# Patient Record
Sex: MALE | Race: WHITE | NOT HISPANIC OR LATINO | URBAN - METROPOLITAN AREA
[De-identification: names, ages, dates, MRNs, and addresses within clinical notes are randomized per-mention and may not be internally consistent; named-entity substitution may affect disease eponyms.]

---

## 2019-03-27 ENCOUNTER — OUTSIDE PLACE OF SERVICE (OUTPATIENT)
Dept: FAMILY MEDICINE | Facility: CLINIC | Age: 84
End: 2019-03-27

## 2019-03-27 PROCEDURE — 99305 1ST NF CARE MODERATE MDM 35: CPT | Mod: ,,, | Performed by: FAMILY MEDICINE

## 2019-03-27 PROCEDURE — 99305 PR NURSING FACILITY CARE, INIT, MOD SEVERITY: ICD-10-PCS | Mod: ,,, | Performed by: FAMILY MEDICINE

## 2019-04-03 ENCOUNTER — OUTSIDE PLACE OF SERVICE (OUTPATIENT)
Dept: FAMILY MEDICINE | Facility: CLINIC | Age: 84
End: 2019-04-03

## 2019-04-03 PROCEDURE — 99499 UNLISTED E&M SERVICE: CPT | Mod: ,,, | Performed by: FAMILY MEDICINE

## 2019-04-03 PROCEDURE — 99499 NO LOS: ICD-10-PCS | Mod: ,,, | Performed by: FAMILY MEDICINE

## 2019-05-09 ENCOUNTER — OUTSIDE PLACE OF SERVICE (OUTPATIENT)
Dept: FAMILY MEDICINE | Facility: CLINIC | Age: 84
End: 2019-05-09

## 2019-05-09 PROCEDURE — 99307 PR NURSING FAC CARE, SUBSEQ, IMPROVING: ICD-10-PCS | Mod: ,,, | Performed by: FAMILY MEDICINE

## 2019-05-09 PROCEDURE — 99307 SBSQ NF CARE SF MDM 10: CPT | Mod: ,,, | Performed by: FAMILY MEDICINE

## 2019-09-18 ENCOUNTER — OUTSIDE PLACE OF SERVICE (OUTPATIENT)
Dept: FAMILY MEDICINE | Facility: CLINIC | Age: 84
End: 2019-09-18

## 2019-09-18 PROCEDURE — 99307 SBSQ NF CARE SF MDM 10: CPT | Mod: ,,, | Performed by: FAMILY MEDICINE

## 2019-09-18 PROCEDURE — 99307 PR NURSING FAC CARE, SUBSEQ, IMPROVING: ICD-10-PCS | Mod: ,,, | Performed by: FAMILY MEDICINE

## 2019-11-27 ENCOUNTER — OUTSIDE PLACE OF SERVICE (OUTPATIENT)
Dept: FAMILY MEDICINE | Facility: CLINIC | Age: 84
End: 2019-11-27

## 2019-11-27 PROCEDURE — 99308 PR NURSING FAC CARE, SUBSEQ, MINOR COMPLIC: ICD-10-PCS | Mod: ,,, | Performed by: FAMILY MEDICINE

## 2019-11-27 PROCEDURE — 99308 SBSQ NF CARE LOW MDM 20: CPT | Mod: ,,, | Performed by: FAMILY MEDICINE

## 2020-01-29 ENCOUNTER — OUTSIDE PLACE OF SERVICE (OUTPATIENT)
Dept: FAMILY MEDICINE | Facility: CLINIC | Age: 85
End: 2020-01-29

## 2020-01-29 PROCEDURE — 99308 PR NURSING FAC CARE, SUBSEQ, MINOR COMPLIC: ICD-10-PCS | Mod: ,,, | Performed by: FAMILY MEDICINE

## 2020-01-29 PROCEDURE — 99308 SBSQ NF CARE LOW MDM 20: CPT | Mod: ,,, | Performed by: FAMILY MEDICINE

## 2020-04-01 ENCOUNTER — OUTSIDE PLACE OF SERVICE (OUTPATIENT)
Dept: FAMILY MEDICINE | Facility: CLINIC | Age: 85
End: 2020-04-01

## 2020-04-01 ENCOUNTER — TELEPHONE (OUTPATIENT)
Dept: FAMILY MEDICINE | Facility: CLINIC | Age: 85
End: 2020-04-01

## 2020-04-01 PROCEDURE — 99308 SBSQ NF CARE LOW MDM 20: CPT | Mod: ,,, | Performed by: FAMILY MEDICINE

## 2020-04-01 PROCEDURE — 99308 PR NURSING FAC CARE, SUBSEQ, MINOR COMPLIC: ICD-10-PCS | Mod: ,,, | Performed by: FAMILY MEDICINE

## 2020-04-01 NOTE — TELEPHONE ENCOUNTER
Spoke with his daughter Ms Perales and let her know that Mr. taylor had a increased temperature x2 days and a cough.  Was going to treat him as if he had the corona virus the unsure.  With antibiotics and the medication Plaquenil.

## 2022-09-21 ENCOUNTER — OUTSIDE PLACE OF SERVICE (OUTPATIENT)
Dept: FAMILY MEDICINE | Facility: CLINIC | Age: 87
End: 2022-09-21

## 2022-09-21 PROCEDURE — 99499 NO LOS: ICD-10-PCS | Mod: ,,, | Performed by: FAMILY MEDICINE

## 2022-09-21 PROCEDURE — 99499 UNLISTED E&M SERVICE: CPT | Mod: ,,, | Performed by: FAMILY MEDICINE

## 2023-04-05 ENCOUNTER — OUTSIDE PLACE OF SERVICE (OUTPATIENT)
Dept: ADMINISTRATIVE | Facility: OTHER | Age: 88
End: 2023-04-05
Payer: MEDICARE

## 2023-05-25 ENCOUNTER — HOSPITAL ENCOUNTER (OUTPATIENT)
Facility: HOSPITAL | Age: 88
Discharge: HOME | End: 2023-05-28
Attending: EMERGENCY MEDICINE | Admitting: HOSPITALIST
Payer: MEDICARE

## 2023-05-25 DIAGNOSIS — K92.0 COFFEE GROUND EMESIS: Primary | ICD-10-CM

## 2023-05-25 DIAGNOSIS — R11.10 VOMITING: ICD-10-CM

## 2023-05-25 PROCEDURE — 93010 EKG 12-LEAD: ICD-10-PCS | Mod: ,,, | Performed by: INTERNAL MEDICINE

## 2023-05-25 PROCEDURE — 96376 TX/PRO/DX INJ SAME DRUG ADON: CPT

## 2023-05-25 PROCEDURE — 93005 ELECTROCARDIOGRAM TRACING: CPT

## 2023-05-25 PROCEDURE — 93010 ELECTROCARDIOGRAM REPORT: CPT | Mod: ,,, | Performed by: INTERNAL MEDICINE

## 2023-05-25 PROCEDURE — 96375 TX/PRO/DX INJ NEW DRUG ADDON: CPT

## 2023-05-25 PROCEDURE — 99285 EMERGENCY DEPT VISIT HI MDM: CPT

## 2023-05-26 PROBLEM — Z71.89 ACP (ADVANCE CARE PLANNING): Status: ACTIVE | Noted: 2023-05-26

## 2023-05-26 PROBLEM — I10 PRIMARY HYPERTENSION: Status: ACTIVE | Noted: 2023-05-26

## 2023-05-26 PROBLEM — I48.0 PAROXYSMAL ATRIAL FIBRILLATION: Status: ACTIVE | Noted: 2023-05-26

## 2023-05-26 PROBLEM — F20.9 SCHIZOPHRENIA: Status: ACTIVE | Noted: 2023-05-26

## 2023-05-26 PROBLEM — D64.9 ANEMIA: Status: ACTIVE | Noted: 2023-05-26

## 2023-05-26 PROBLEM — F03.A0 MILD DEMENTIA: Status: ACTIVE | Noted: 2023-05-26

## 2023-05-26 PROBLEM — F41.9 ANXIETY: Status: ACTIVE | Noted: 2023-05-26

## 2023-05-26 PROBLEM — K92.2 ACUTE UPPER GI BLEED: Status: ACTIVE | Noted: 2023-05-26

## 2023-05-26 LAB
ABO + RH BLD: NORMAL
ALBUMIN SERPL BCP-MCNC: 3.2 G/DL (ref 3.5–5.2)
ALP SERPL-CCNC: 87 U/L (ref 55–135)
ALT SERPL W/O P-5'-P-CCNC: 10 U/L (ref 10–44)
ANION GAP SERPL CALC-SCNC: 15 MMOL/L (ref 8–16)
ANION GAP SERPL CALC-SCNC: 9 MMOL/L (ref 8–16)
APTT PPP: 26.7 SEC (ref 21–32)
AST SERPL-CCNC: 27 U/L (ref 10–40)
BASOPHILS # BLD AUTO: 0.03 K/UL (ref 0–0.2)
BASOPHILS # BLD AUTO: 0.03 K/UL (ref 0–0.2)
BASOPHILS # BLD AUTO: 0.04 K/UL (ref 0–0.2)
BASOPHILS # BLD AUTO: 0.04 K/UL (ref 0–0.2)
BASOPHILS NFR BLD: 0.2 % (ref 0–1.9)
BASOPHILS NFR BLD: 0.3 % (ref 0–1.9)
BILIRUB SERPL-MCNC: 0.3 MG/DL (ref 0.1–1)
BLD GP AB SCN CELLS X3 SERPL QL: NORMAL
BUN SERPL-MCNC: 25 MG/DL (ref 10–30)
BUN SERPL-MCNC: 26 MG/DL (ref 10–30)
CALCIUM SERPL-MCNC: 8.6 MG/DL (ref 8.7–10.5)
CALCIUM SERPL-MCNC: 8.8 MG/DL (ref 8.7–10.5)
CHLORIDE SERPL-SCNC: 106 MMOL/L (ref 95–110)
CHLORIDE SERPL-SCNC: 108 MMOL/L (ref 95–110)
CO2 SERPL-SCNC: 20 MMOL/L (ref 23–29)
CO2 SERPL-SCNC: 22 MMOL/L (ref 23–29)
CREAT SERPL-MCNC: 0.8 MG/DL (ref 0.5–1.4)
CREAT SERPL-MCNC: 0.9 MG/DL (ref 0.5–1.4)
DIFFERENTIAL METHOD: ABNORMAL
EOSINOPHIL # BLD AUTO: 0.1 K/UL (ref 0–0.5)
EOSINOPHIL # BLD AUTO: 0.1 K/UL (ref 0–0.5)
EOSINOPHIL # BLD AUTO: 0.2 K/UL (ref 0–0.5)
EOSINOPHIL # BLD AUTO: 0.2 K/UL (ref 0–0.5)
EOSINOPHIL NFR BLD: 0.7 % (ref 0–8)
EOSINOPHIL NFR BLD: 0.8 % (ref 0–8)
EOSINOPHIL NFR BLD: 1.1 % (ref 0–8)
EOSINOPHIL NFR BLD: 1.1 % (ref 0–8)
ERYTHROCYTE [DISTWIDTH] IN BLOOD BY AUTOMATED COUNT: 15.1 % (ref 11.5–14.5)
ERYTHROCYTE [DISTWIDTH] IN BLOOD BY AUTOMATED COUNT: 15.3 % (ref 11.5–14.5)
ERYTHROCYTE [DISTWIDTH] IN BLOOD BY AUTOMATED COUNT: 15.3 % (ref 11.5–14.5)
ERYTHROCYTE [DISTWIDTH] IN BLOOD BY AUTOMATED COUNT: 15.4 % (ref 11.5–14.5)
EST. GFR  (NO RACE VARIABLE): >60 ML/MIN/1.73 M^2
EST. GFR  (NO RACE VARIABLE): >60 ML/MIN/1.73 M^2
FERRITIN SERPL-MCNC: 381 NG/ML (ref 20–300)
FOLATE SERPL-MCNC: 15.2 NG/ML (ref 4–24)
GLUCOSE SERPL-MCNC: 107 MG/DL (ref 70–110)
GLUCOSE SERPL-MCNC: 92 MG/DL (ref 70–110)
HCT VFR BLD AUTO: 39 % (ref 40–54)
HCT VFR BLD AUTO: 39.1 % (ref 40–54)
HCT VFR BLD AUTO: 41.5 % (ref 40–54)
HCT VFR BLD AUTO: 41.8 % (ref 40–54)
HGB BLD-MCNC: 12.8 G/DL (ref 14–18)
HGB BLD-MCNC: 12.9 G/DL (ref 14–18)
HGB BLD-MCNC: 13.4 G/DL (ref 14–18)
HGB BLD-MCNC: 13.6 G/DL (ref 14–18)
IMM GRANULOCYTES # BLD AUTO: 0.08 K/UL (ref 0–0.04)
IMM GRANULOCYTES # BLD AUTO: 0.09 K/UL (ref 0–0.04)
IMM GRANULOCYTES # BLD AUTO: 0.09 K/UL (ref 0–0.04)
IMM GRANULOCYTES # BLD AUTO: 0.11 K/UL (ref 0–0.04)
IMM GRANULOCYTES NFR BLD AUTO: 0.6 % (ref 0–0.5)
IMM GRANULOCYTES NFR BLD AUTO: 0.6 % (ref 0–0.5)
IMM GRANULOCYTES NFR BLD AUTO: 0.7 % (ref 0–0.5)
IMM GRANULOCYTES NFR BLD AUTO: 0.8 % (ref 0–0.5)
INR PPP: 1.2 (ref 0.8–1.2)
IRON SERPL-MCNC: 34 UG/DL (ref 45–160)
LACTATE SERPL-SCNC: 2.4 MMOL/L (ref 0.5–2.2)
LACTATE SERPL-SCNC: 2.7 MMOL/L (ref 0.5–2.2)
LYMPHOCYTES # BLD AUTO: 2.1 K/UL (ref 1–4.8)
LYMPHOCYTES # BLD AUTO: 2.5 K/UL (ref 1–4.8)
LYMPHOCYTES # BLD AUTO: 2.6 K/UL (ref 1–4.8)
LYMPHOCYTES # BLD AUTO: 2.7 K/UL (ref 1–4.8)
LYMPHOCYTES NFR BLD: 17.6 % (ref 18–48)
LYMPHOCYTES NFR BLD: 17.9 % (ref 18–48)
LYMPHOCYTES NFR BLD: 18.6 % (ref 18–48)
LYMPHOCYTES NFR BLD: 18.8 % (ref 18–48)
MCH RBC QN AUTO: 33.1 PG (ref 27–31)
MCH RBC QN AUTO: 33.1 PG (ref 27–31)
MCH RBC QN AUTO: 33.3 PG (ref 27–31)
MCH RBC QN AUTO: 33.4 PG (ref 27–31)
MCHC RBC AUTO-ENTMCNC: 32.3 G/DL (ref 32–36)
MCHC RBC AUTO-ENTMCNC: 32.5 G/DL (ref 32–36)
MCHC RBC AUTO-ENTMCNC: 32.7 G/DL (ref 32–36)
MCHC RBC AUTO-ENTMCNC: 33.1 G/DL (ref 32–36)
MCV RBC AUTO: 101 FL (ref 82–98)
MCV RBC AUTO: 101 FL (ref 82–98)
MCV RBC AUTO: 102 FL (ref 82–98)
MCV RBC AUTO: 103 FL (ref 82–98)
MONOCYTES # BLD AUTO: 0.9 K/UL (ref 0.3–1)
MONOCYTES # BLD AUTO: 0.9 K/UL (ref 0.3–1)
MONOCYTES # BLD AUTO: 1 K/UL (ref 0.3–1)
MONOCYTES # BLD AUTO: 1.2 K/UL (ref 0.3–1)
MONOCYTES NFR BLD: 5.9 % (ref 4–15)
MONOCYTES NFR BLD: 7.4 % (ref 4–15)
MONOCYTES NFR BLD: 7.8 % (ref 4–15)
MONOCYTES NFR BLD: 8 % (ref 4–15)
NEUTROPHILS # BLD AUTO: 10.8 K/UL (ref 1.8–7.7)
NEUTROPHILS # BLD AUTO: 11 K/UL (ref 1.8–7.7)
NEUTROPHILS # BLD AUTO: 8 K/UL (ref 1.8–7.7)
NEUTROPHILS # BLD AUTO: 9.6 K/UL (ref 1.8–7.7)
NEUTROPHILS NFR BLD: 71.5 % (ref 38–73)
NEUTROPHILS NFR BLD: 72.1 % (ref 38–73)
NEUTROPHILS NFR BLD: 72.7 % (ref 38–73)
NEUTROPHILS NFR BLD: 74.2 % (ref 38–73)
NRBC BLD-RTO: 0 /100 WBC
PLATELET # BLD AUTO: 120 K/UL (ref 150–450)
PLATELET # BLD AUTO: 120 K/UL (ref 150–450)
PLATELET # BLD AUTO: 124 K/UL (ref 150–450)
PLATELET # BLD AUTO: 126 K/UL (ref 150–450)
PMV BLD AUTO: 10.8 FL (ref 9.2–12.9)
PMV BLD AUTO: 11 FL (ref 9.2–12.9)
PMV BLD AUTO: 11.3 FL (ref 9.2–12.9)
PMV BLD AUTO: 11.5 FL (ref 9.2–12.9)
POTASSIUM SERPL-SCNC: 3.6 MMOL/L (ref 3.5–5.1)
POTASSIUM SERPL-SCNC: 4 MMOL/L (ref 3.5–5.1)
PROT SERPL-MCNC: 7 G/DL (ref 6–8.4)
PROTHROMBIN TIME: 13 SEC (ref 9–12.5)
RBC # BLD AUTO: 3.86 M/UL (ref 4.6–6.2)
RBC # BLD AUTO: 3.87 M/UL (ref 4.6–6.2)
RBC # BLD AUTO: 4.05 M/UL (ref 4.6–6.2)
RBC # BLD AUTO: 4.09 M/UL (ref 4.6–6.2)
SATURATED IRON: 15 % (ref 20–50)
SODIUM SERPL-SCNC: 139 MMOL/L (ref 136–145)
SODIUM SERPL-SCNC: 141 MMOL/L (ref 136–145)
SPECIMEN OUTDATE: NORMAL
TOTAL IRON BINDING CAPACITY: 229 UG/DL (ref 250–450)
TRANSFERRIN SERPL-MCNC: 155 MG/DL (ref 200–375)
TROPONIN I SERPL DL<=0.01 NG/ML-MCNC: 0.01 NG/ML (ref 0–0.03)
VIT B12 SERPL-MCNC: 934 PG/ML (ref 210–950)
WBC # BLD AUTO: 11.24 K/UL (ref 3.9–12.7)
WBC # BLD AUTO: 13.25 K/UL (ref 3.9–12.7)
WBC # BLD AUTO: 14.55 K/UL (ref 3.9–12.7)
WBC # BLD AUTO: 15.09 K/UL (ref 3.9–12.7)

## 2023-05-26 PROCEDURE — 85025 COMPLETE CBC W/AUTO DIFF WBC: CPT | Performed by: EMERGENCY MEDICINE

## 2023-05-26 PROCEDURE — G0378 HOSPITAL OBSERVATION PER HR: HCPCS

## 2023-05-26 PROCEDURE — 63600175 PHARM REV CODE 636 W HCPCS: Performed by: EMERGENCY MEDICINE

## 2023-05-26 PROCEDURE — 63600175 PHARM REV CODE 636 W HCPCS: Performed by: HOSPITALIST

## 2023-05-26 PROCEDURE — 99223 1ST HOSP IP/OBS HIGH 75: CPT | Mod: GC,,, | Performed by: INTERNAL MEDICINE

## 2023-05-26 PROCEDURE — 82728 ASSAY OF FERRITIN: CPT | Performed by: INTERNAL MEDICINE

## 2023-05-26 PROCEDURE — 80048 BASIC METABOLIC PNL TOTAL CA: CPT | Mod: XB | Performed by: INTERNAL MEDICINE

## 2023-05-26 PROCEDURE — 86900 BLOOD TYPING SEROLOGIC ABO: CPT | Performed by: INTERNAL MEDICINE

## 2023-05-26 PROCEDURE — 25000003 PHARM REV CODE 250: Performed by: INTERNAL MEDICINE

## 2023-05-26 PROCEDURE — 85730 THROMBOPLASTIN TIME PARTIAL: CPT | Performed by: INTERNAL MEDICINE

## 2023-05-26 PROCEDURE — C9113 INJ PANTOPRAZOLE SODIUM, VIA: HCPCS | Performed by: INTERNAL MEDICINE

## 2023-05-26 PROCEDURE — 36415 COLL VENOUS BLD VENIPUNCTURE: CPT | Performed by: HOSPITALIST

## 2023-05-26 PROCEDURE — 82746 ASSAY OF FOLIC ACID SERUM: CPT | Performed by: INTERNAL MEDICINE

## 2023-05-26 PROCEDURE — 63600175 PHARM REV CODE 636 W HCPCS: Performed by: INTERNAL MEDICINE

## 2023-05-26 PROCEDURE — 84484 ASSAY OF TROPONIN QUANT: CPT | Performed by: EMERGENCY MEDICINE

## 2023-05-26 PROCEDURE — 83605 ASSAY OF LACTIC ACID: CPT | Mod: 91 | Performed by: HOSPITALIST

## 2023-05-26 PROCEDURE — 80053 COMPREHEN METABOLIC PANEL: CPT | Performed by: EMERGENCY MEDICINE

## 2023-05-26 PROCEDURE — 85025 COMPLETE CBC W/AUTO DIFF WBC: CPT | Mod: 91 | Performed by: INTERNAL MEDICINE

## 2023-05-26 PROCEDURE — 25000003 PHARM REV CODE 250: Performed by: HOSPITALIST

## 2023-05-26 PROCEDURE — 85610 PROTHROMBIN TIME: CPT | Performed by: INTERNAL MEDICINE

## 2023-05-26 PROCEDURE — 96367 TX/PROPH/DG ADDL SEQ IV INF: CPT

## 2023-05-26 PROCEDURE — 84466 ASSAY OF TRANSFERRIN: CPT | Performed by: INTERNAL MEDICINE

## 2023-05-26 PROCEDURE — 99223 PR INITIAL HOSPITAL CARE,LEVL III: ICD-10-PCS | Mod: GC,,, | Performed by: INTERNAL MEDICINE

## 2023-05-26 PROCEDURE — C9113 INJ PANTOPRAZOLE SODIUM, VIA: HCPCS | Performed by: EMERGENCY MEDICINE

## 2023-05-26 PROCEDURE — 82607 VITAMIN B-12: CPT | Performed by: INTERNAL MEDICINE

## 2023-05-26 RX ORDER — ATENOLOL 25 MG/1
25 TABLET ORAL DAILY
Status: ON HOLD | COMMUNITY
Start: 2023-05-17 | End: 2023-05-26 | Stop reason: HOSPADM

## 2023-05-26 RX ORDER — DIVALPROEX SODIUM 250 MG/1
250 TABLET, DELAYED RELEASE ORAL EVERY 12 HOURS
Status: DISCONTINUED | OUTPATIENT
Start: 2023-05-26 | End: 2023-05-28 | Stop reason: HOSPADM

## 2023-05-26 RX ORDER — DIVALPROEX SODIUM 250 MG/1
250 TABLET, DELAYED RELEASE ORAL 2 TIMES DAILY
COMMUNITY
Start: 2023-05-10

## 2023-05-26 RX ORDER — AMLODIPINE BESYLATE 5 MG/1
5 TABLET ORAL DAILY
COMMUNITY
Start: 2023-05-04

## 2023-05-26 RX ORDER — PRAVASTATIN SODIUM 20 MG/1
20 TABLET ORAL DAILY
COMMUNITY
Start: 2023-04-17

## 2023-05-26 RX ORDER — QUETIAPINE FUMARATE 100 MG/1
100 TABLET, FILM COATED ORAL NIGHTLY
Status: DISCONTINUED | OUTPATIENT
Start: 2023-05-26 | End: 2023-05-28 | Stop reason: HOSPADM

## 2023-05-26 RX ORDER — ALPRAZOLAM 0.5 MG/1
0.5 TABLET ORAL EVERY 8 HOURS PRN
Status: ON HOLD | COMMUNITY
Start: 2023-04-03 | End: 2023-05-26 | Stop reason: HOSPADM

## 2023-05-26 RX ORDER — AMLODIPINE BESYLATE 5 MG/1
5 TABLET ORAL DAILY
Status: DISCONTINUED | OUTPATIENT
Start: 2023-05-26 | End: 2023-05-28 | Stop reason: HOSPADM

## 2023-05-26 RX ORDER — PANTOPRAZOLE SODIUM 40 MG/10ML
40 INJECTION, POWDER, LYOPHILIZED, FOR SOLUTION INTRAVENOUS 2 TIMES DAILY
Status: DISCONTINUED | OUTPATIENT
Start: 2023-05-26 | End: 2023-05-28 | Stop reason: HOSPADM

## 2023-05-26 RX ORDER — PANTOPRAZOLE SODIUM 40 MG/10ML
40 INJECTION, POWDER, LYOPHILIZED, FOR SOLUTION INTRAVENOUS
Status: COMPLETED | OUTPATIENT
Start: 2023-05-26 | End: 2023-05-26

## 2023-05-26 RX ORDER — ESCITALOPRAM OXALATE 10 MG/1
10 TABLET ORAL DAILY
COMMUNITY
Start: 2023-05-08

## 2023-05-26 RX ORDER — DEXTROMETHORPHAN HYDROBROMIDE, GUAIFENESIN 5; 100 MG/5ML; MG/5ML
650 LIQUID ORAL EVERY 8 HOURS PRN
COMMUNITY

## 2023-05-26 RX ORDER — QUETIAPINE FUMARATE 100 MG/1
100 TABLET, FILM COATED ORAL NIGHTLY
COMMUNITY
Start: 2023-05-12

## 2023-05-26 RX ORDER — DONEPEZIL HYDROCHLORIDE 5 MG/1
10 TABLET, FILM COATED ORAL NIGHTLY
Status: DISCONTINUED | OUTPATIENT
Start: 2023-05-26 | End: 2023-05-28 | Stop reason: HOSPADM

## 2023-05-26 RX ORDER — ESCITALOPRAM OXALATE 5 MG/1
5 TABLET ORAL DAILY
Status: DISCONTINUED | OUTPATIENT
Start: 2023-05-26 | End: 2023-05-28 | Stop reason: HOSPADM

## 2023-05-26 RX ORDER — QUETIAPINE FUMARATE 50 MG/1
50 TABLET, FILM COATED ORAL NIGHTLY
COMMUNITY
Start: 2023-05-02 | End: 2023-05-26 | Stop reason: DRUGHIGH

## 2023-05-26 RX ORDER — I-VITE, TAB 1000-60-2MG (60/BT) 300MCG-200
TAB ORAL
COMMUNITY
Start: 2023-04-18 | End: 2023-05-26 | Stop reason: DRUGHIGH

## 2023-05-26 RX ORDER — NAPROXEN 250 MG/1
250 TABLET ORAL DAILY
Status: ON HOLD | COMMUNITY
Start: 2023-04-25 | End: 2023-05-26 | Stop reason: HOSPADM

## 2023-05-26 RX ORDER — PRAVASTATIN SODIUM 20 MG/1
20 TABLET ORAL NIGHTLY
Status: DISCONTINUED | OUTPATIENT
Start: 2023-05-26 | End: 2023-05-28 | Stop reason: HOSPADM

## 2023-05-26 RX ORDER — ASPIRIN 81 MG/1
81 TABLET ORAL DAILY
Status: ON HOLD | COMMUNITY
Start: 2023-05-24 | End: 2023-05-26 | Stop reason: HOSPADM

## 2023-05-26 RX ORDER — DEXTROMETHORPHAN HBR. AND GUAIFENESIN 10; 100 MG/5ML; MG/5ML
5 SOLUTION ORAL EVERY 4 HOURS PRN
COMMUNITY

## 2023-05-26 RX ORDER — SODIUM CHLORIDE, SODIUM LACTATE, POTASSIUM CHLORIDE, CALCIUM CHLORIDE 600; 310; 30; 20 MG/100ML; MG/100ML; MG/100ML; MG/100ML
INJECTION, SOLUTION INTRAVENOUS CONTINUOUS
Status: ACTIVE | OUTPATIENT
Start: 2023-05-26 | End: 2023-05-26

## 2023-05-26 RX ORDER — PANTOPRAZOLE SODIUM 40 MG/1
40 TABLET, DELAYED RELEASE ORAL DAILY
Qty: 30 TABLET | Refills: 11
Start: 2023-05-26 | End: 2024-05-25

## 2023-05-26 RX ORDER — MULTIVIT-MIN/IRON FUM/FOLIC AC 7.5 MG-4
1 TABLET ORAL DAILY
COMMUNITY
Start: 2023-05-08

## 2023-05-26 RX ORDER — DONEPEZIL HYDROCHLORIDE 10 MG/1
10 TABLET, FILM COATED ORAL NIGHTLY
COMMUNITY
Start: 2023-05-08

## 2023-05-26 RX ADMIN — SODIUM CHLORIDE, SODIUM LACTATE, POTASSIUM CHLORIDE, AND CALCIUM CHLORIDE: .6; .31; .03; .02 INJECTION, SOLUTION INTRAVENOUS at 04:05

## 2023-05-26 RX ADMIN — QUETIAPINE FUMARATE 100 MG: 100 TABLET ORAL at 09:05

## 2023-05-26 RX ADMIN — DIVALPROEX SODIUM 250 MG: 250 TABLET, DELAYED RELEASE ORAL at 10:05

## 2023-05-26 RX ADMIN — DONEPEZIL HYDROCHLORIDE 10 MG: 5 TABLET ORAL at 09:05

## 2023-05-26 RX ADMIN — PANTOPRAZOLE SODIUM 40 MG: 40 INJECTION, POWDER, LYOPHILIZED, FOR SOLUTION INTRAVENOUS at 09:05

## 2023-05-26 RX ADMIN — AMLODIPINE BESYLATE 5 MG: 5 TABLET ORAL at 10:05

## 2023-05-26 RX ADMIN — ESCITALOPRAM 5 MG: 5 TABLET, FILM COATED ORAL at 10:05

## 2023-05-26 RX ADMIN — DIVALPROEX SODIUM 250 MG: 250 TABLET, DELAYED RELEASE ORAL at 11:05

## 2023-05-26 RX ADMIN — PANTOPRAZOLE SODIUM 40 MG: 40 INJECTION, POWDER, LYOPHILIZED, FOR SOLUTION INTRAVENOUS at 06:05

## 2023-05-26 RX ADMIN — IRON SUCROSE 200 MG: 20 INJECTION, SOLUTION INTRAVENOUS at 01:05

## 2023-05-26 RX ADMIN — PRAVASTATIN SODIUM 20 MG: 20 TABLET ORAL at 09:05

## 2023-05-26 RX ADMIN — PANTOPRAZOLE SODIUM 40 MG: 40 INJECTION, POWDER, FOR SOLUTION INTRAVENOUS at 01:05

## 2023-05-26 NOTE — ASSESSMENT & PLAN NOTE
Patient with Persistent (7 days or more) atrial fibrillation which is controlled currently with Beta Blocker. Patient is currently in atrial fibrillation.[id number] Score: 2. Anticoagulation not indicated due to acute GIB.   - Not on long term AC per previous family discussions

## 2023-05-26 NOTE — PLAN OF CARE
"   05/26/23 1544   Post-Acute Status   Post-Acute Authorization Placement   Post-Acute Placement Status Pending medical clearance/testing  (Clinicals faxed to facility [facility])       No future appointments.    /60 (BP Location: Left arm, Patient Position: Lying)   Pulse 81   Temp 96.5 °F (35.8 °C) (Oral)   Resp 18   Ht 5' 8" (1.727 m)   Wt 72.5 kg (159 lb 13.3 oz)   SpO2 96%   BMI 24.30 kg/m²      amLODIPine  5 mg Oral Daily    divalproex  250 mg Oral Q12H    donepeziL  10 mg Oral QHS    EScitalopram oxalate  5 mg Oral Daily    pantoprazole  40 mg Intravenous BID    pravastatin  20 mg Oral QHS    QUEtiapine  100 mg Oral QHS       "

## 2023-05-26 NOTE — HPI
89 yo male living at a nursing home with a PMHx of HTN, HLD, schizophrenia, and dementia here for coffee-ground emesis.    Patient is pleasantly demented, but is somewhat a poor historian.  History obtained primarily through discuss prior discussion with nursing home staff and emergency physician.  Family is not at bedside.  Per history, the patient was doing well and nursing home until today when he had 1 episode of witnessed coffee-ground emesis associated with nausea.  Per the patient was only 1 episode.  Denies any further emesis, abdominal pain, diarrhea, hematochezia, melena, chest pain, dyspnea.  Does not have much of appetite in general.  No history of GI bleeding per the records.  He does have atrial fibrillation but is not on anticoagulation.    Of note, the patient is DNR based on advance care notes at the nursing home.    In the ED, vital signs were stable.  Labs remarkable for mixed macrocytic anemia with a hemoglobin of 13. No further episodes of emesis or melena/hematochezia.

## 2023-05-26 NOTE — CONSULTS
"[facility]    CC: Coffee ground emesis    HPI 89 y.o. male with a past medical history of a-fib not on anticoagulation, hypertension, and hyperlipidemia, and dementia who was admitted for 1 episode of coffee ground emesis and nausea the night of admission. He currently lives in a nursing home and this was detailed by the staff.     The patient was unable to provide history and the daughter (patient's mpoa) stated clearly that she would not like to proceed with a procedure to work-up and/or treat the condition. She understands that he may continue to bleed if the procedure is not conducted. She was amenable to try oral medications that will aid in the case of peptic ulcer disease. She reports that they are planning to proceed the hospice route in the future.She is unsure if the patient had received NSAIDs at the nursing home facility. Otherwise, she reports his diet is good and he consumes root beer and coffee daily. No reports of melena or hematochezia.       Past Medical History  Past Medical History:   Diagnosis Date    Hyperlipidemia     Hypertension     Paroxysmal atrial fibrillation     Schizophrenia, unspecified        Review of Systems  General ROS: negative for chills, fever or weight loss  Cardiovascular ROS: no chest pain or dyspnea on exertion  Gastrointestinal ROS: no abdominal pain, change in bowel habits, or black/ bloody stools; + coffee ground emesis    Physical Examination  BP 91/60 (BP Location: Right arm, Patient Position: Sitting)   Pulse 87   Temp 97.4 °F (36.3 °C) (Oral)   Resp 18   Ht 5' 8" (1.727 m)   Wt 68 kg (150 lb)   SpO2 99%   BMI 22.81 kg/m²   General appearance: alert, cooperative, no distress; hard of hearing; demented  Cardiovascular: irregularly irregular rhythm, normal rate  Abdomen: soft, non-tender; bowel sounds normoactive; no organomegaly      Labs:  Hb 13.6 ->13.4      Ferritin 348  INR 1.2    Imaging:  Prior imaging reviewed     I have personally " reviewed these images.    Assessment:   Mr. James is a 89 year old male patient with significant medical history of a-fib not on anticoagulation who experienced 1 episode of coffee ground emesis and associated nausea. The most likely cause of an upper GI bleed is peptic ulcer disease. His Hb is 13.4 and stable intervally. It is unknown if the patient has been receiving NSAIDs at the nursing home. Differential also includes erosive gastritis, GAVE, telangiectasias, esophagitis, GI cancer, or hiatal hernia. The patient's MPOA does not want to proceed with workup with EGD but is amenable to oral PPI medications that will reduce acid secretion in the stomach and assist with platelet clotting in the case of a peptic ulcer.     Plan:  - No EGD per MPOA  - Continue oral PPI on discharge  - No NSAIDs     Rigoberto Josue MD  [facility]  [facility]    Case discussed with Dr. Morgan

## 2023-05-26 NOTE — PLAN OF CARE
"[facility] - Telemetry  Initial Discharge Assessment       Primary Care Provider: No primary care provider on file.    Admission Diagnosis: Vomiting [R11.10]  Coffee ground emesis [K92.0]    Admission Date: 5/25/2023  Expected Discharge Date:     Pt oriented. DCA done at bedside with patient. Demographics/Ins/NextofKin/PCP verified with patient/family and updated as needed. Denies any DME needs at present from pt/family. Patient/family plans to return home with family. Educated on bedside RX. Patient consents for [place] to discuss discharge plan with next of kin. Preferences appointment times and location obtained. Patient reports they will have transportation home upon discharge.      Transition of Care Barriers: (P) None    Payor: [employer] MANAGED MEDICARE / Plan: [employer] MEDICARE HMO / Product Type: Capitation /     No emergency contact information on file.    Discharge Plan A: (P) Return to nursing home       No Pharmacies Listed    Initial Assessment (most recent)       Adult Discharge Assessment - 05/26/23 1541          Discharge Assessment    Assessment Type Discharge Planning Assessment (P)      Source of Information health record (P)      People in Home facility resident (P)      Facility Arrived From: [facility] (P)      Do you expect to return to your current living situation? Yes (P)      Discharge Plan A Return to nursing home (P)      Transition of Care Barriers None (P)                    No future appointments.  /60 (BP Location: Left arm, Patient Position: Lying)   Pulse 81   Temp 96.5 °F (35.8 °C) (Oral)   Resp 18   Ht 5' 8" (1.727 m)   Wt 72.5 kg (159 lb 13.3 oz)   SpO2 96%   BMI 24.30 kg/m²      amLODIPine  5 mg Oral Daily    divalproex  250 mg Oral Q12H    donepeziL  10 mg Oral QHS    EScitalopram oxalate  5 mg Oral Daily    pantoprazole  40 mg Intravenous BID    pravastatin  20 mg Oral QHS    QUEtiapine  100 mg Oral QHS       Consults (From admission, onward)          Status " Ordering Provider     IP consult to case management  Once        Provider:  (Not yet assigned)    Ordered DERECK BROWN     Inpatient consult to Gastroenterology  Once        Provider:  (Not yet assigned)    Completed CAN SCHWARZ

## 2023-05-26 NOTE — PLAN OF CARE
[facility]    FACILITY TRANSFER ORDERS      Patient Name: Augie James  YOB: 1929    PCP: No primary care provider on file.   PCP Address: No primary physician on file.  PCP Phone Number: None  PCP Fax: None    Encounter Date: 05/28/2023    Ad[facility] to: [facility]    Vital Signs:  Routine    Diagnoses:   Active Hospital Problems    Diagnosis  POA    *Acute upper GI bleed [K92.2]  Yes    Pneumonia [J18.9]  Yes    Mild dementia [F03.A0]  Yes    Macrocytic anemia [D64.9]  Yes    Primary hypertension [I10]  Yes    Paroxysmal atrial fibrillation [I48.0]  Yes    Schizophrenia [F20.9]  Yes    Anxiety [F41.9]  Yes    ACP (advance care planning) [Z71.89]  Not Applicable      Resolved Hospital Problems   No resolved problems to display.       Allergies:Review of patient's allergies indicates:  No Known Allergies    Diet: soft diet    Activities: Activity as tolerated    Goals of Care Treatment Preferences:  Code Status: DNR    Living Will: Yes              Nursing: per facility protocol     Labs: per facility protocol    CONSULTS:    N/a    MISCELLANEOUS CARE:  Routine Skin for Bedridden Patients: Apply moisture barrier cream to all skin folds and wet areas in perineal area daily and after baths and all bowel movements.    WOUND CARE ORDERS  None    Medications: Review discharge medications with patient and family and provide education.      Current Discharge Medication List        START taking these medications    Details   amoxicillin-clavulanate 875-125mg (AUGMENTIN) 875-125 mg per tablet Take 1 tablet by mouth 2 (two) times daily. End 6/1/23      pantoprazole (PROTONIX) 40 MG tablet Take 1 tablet (40 mg total) by mouth once daily.  Qty: 30 tablet, Refills: 11           CONTINUE these medications which have NOT CHANGED    Details   acetaminophen (TYLENOL) 650 MG TbSR Take 650 mg by mouth every 8 (eight) hours as needed.      amLODIPine (NORVASC) 5 MG tablet Take 5 mg by mouth once  daily.      beta-carotene,A,-vits C,E/mins (OCUVITE ORAL) Take 1 tablet by mouth once daily.      dextromethorphan-guaiFENesin (ADULT ROBITUSSIN PEAK COLD DM)  mg/5 mL Liqd Take 5 mLs by mouth every 4 (four) hours as needed.      divalproex (DEPAKOTE) 250 MG EC tablet Take 250 mg by mouth 2 (two) times daily.      donepeziL (ARICEPT) 10 MG tablet Take 10 mg by mouth every evening.      EScitalopram oxalate (LEXAPRO) 10 MG tablet Take 10 mg by mouth once daily.      multivit-min-iron fum-folic ac 7.5 mg iron-400 mcg Tab Take 1 tablet by mouth once daily.      pravastatin (PRAVACHOL) 20 MG tablet Take 20 mg by mouth once daily.      QUEtiapine (SEROQUEL) 100 MG Tab Take 100 mg by mouth every evening.           STOP taking these medications       ALPRAZolam (XANAX) 0.5 MG tablet Comments:   Reason for Stopping:         aspirin (ECOTRIN) 81 MG EC tablet Comments:   Reason for Stopping:         atenoloL (TENORMIN) 25 MG tablet Comments:   Reason for Stopping:         naproxen (NAPROSYN) 250 MG tablet Comments:   Reason for Stopping:                  Immunizations Administered as of 5/28/2023       No immunizations on file.                  _________________________________  Brennan Rodríguez MD  05/28/2023

## 2023-05-26 NOTE — ASSESSMENT & PLAN NOTE
- Hgb 13.6 with MCV > 100 but RDW > 15; likely mixed anemia  - No further episodes of coffee-ground emesis or tania hematemesis  - Start IV PPI BID  - NPO  - GI consult  - Type and screen, CBC q8h for next 24 hours  - Iron panel pending  - Start IVF @ 100 cc/hr  - Telemetry

## 2023-05-26 NOTE — PHARMACY MED REC
"Admission Medication History     The home medication history was taken by Dasha Back CPhT.    Medication history obtained from, [facility] List and Nurse    You may go to "Admission" then "Reconcile Home Medications" tabs to review and/or act upon these items.     The home medication list has been updated by the Pharmacy department.   Please read ALL comments highlighted in yellow.   Please address this information as you see fit.    Feel free to contact us if you have any questions or require assistance.            Dasha Back CPhT.  Ext [phone]               .        "

## 2023-05-26 NOTE — PLAN OF CARE
Problem: Adjustment to Illness (Gastrointestinal Bleeding)  Goal: Optimal Coping with Acute Illness  Outcome: Ongoing, Progressing

## 2023-05-26 NOTE — SUBJECTIVE & OBJECTIVE
Past Medical History:   Diagnosis Date    Hyperlipidemia     Hypertension     Paroxysmal atrial fibrillation     Schizophrenia, unspecified        History reviewed. No pertinent surgical history.    Review of patient's allergies indicates:  No Known Allergies    No current facility-administered medications on file prior to encounter.     No current outpatient medications on file prior to encounter.     Family History       Problem Relation (Age of Onset)    No Known Problems Mother, Father          Tobacco Use    Smoking status: Former     Types: Cigarettes    Smokeless tobacco: Never   Substance and Sexual Activity    Alcohol use: Not Currently    Drug use: Never    Sexual activity: Not Currently     Review of Systems   Reason unable to perform ROS: dementia.   Objective:     Vital Signs (Most Recent):  Temp: 97.4 °F (36.3 °C) (05/26/23 0007)  Pulse: 90 (05/26/23 0007)  Resp: 18 (05/26/23 0007)  BP: 130/72 (05/26/23 0007)  SpO2: (!) 94 % (05/26/23 0007) Vital Signs (24h Range):  Temp:  [97.4 °F (36.3 °C)] 97.4 °F (36.3 °C)  Pulse:  [85-90] 90  Resp:  [18] 18  SpO2:  [94 %-98 %] 94 %  BP: (130-134)/(68-72) 130/72     Weight: 68 kg (150 lb)  Body mass index is 22.81 kg/m².     Physical Exam  Vitals and nursing note reviewed.   Constitutional:       Appearance: Normal appearance. He is normal weight.   HENT:      Head: Normocephalic and atraumatic.      Mouth/Throat:      Mouth: Mucous membranes are dry.   Eyes:      Extraocular Movements: Extraocular movements intact.      Pupils: Pupils are equal, round, and reactive to light.   Cardiovascular:      Rate and Rhythm: Normal rate and regular rhythm.      Pulses: Normal pulses.      Heart sounds: Murmur heard.   Pulmonary:      Effort: Pulmonary effort is normal.      Breath sounds: Normal breath sounds. No wheezing or rales.   Abdominal:      General: Abdomen is flat. Bowel sounds are normal. There is no distension.      Palpations: Abdomen is soft.      Tenderness:  There is abdominal tenderness.   Musculoskeletal:         General: No swelling.   Skin:     General: Skin is warm.      Capillary Refill: Capillary refill takes less than 2 seconds.      Findings: No erythema.   Neurological:      General: No focal deficit present.      Mental Status: He is alert. Mental status is at baseline. He is disoriented.            CRANIAL NERVES     CN III, IV, VI   Pupils are equal, round, and reactive to light.     Significant Labs: All pertinent labs within the past 24 hours have been reviewed.    Significant Imaging: I have reviewed all pertinent imaging results/findings within the past 24 hours.

## 2023-05-26 NOTE — H&P
[facility] [place] [facility] Medicine  History & Physical    Patient Name: Augie James  MRN: [id number]  Patient Class: OP- Observation  Admission Date: 5/25/2023  Attending Physician: Brennan Rodríguez, *   Primary Care Provider: No primary care provider on file.         Patient information was obtained from patient, nursing home, past medical records and ER records.     Subjective:     Principal Problem:Acute upper GI bleed    Chief Complaint:   Chief Complaint   Patient presents with    Vomiting     Vomited coffee ground emesis 1 time at facility. No meds given en route by EMS.        HPI: 89 yo male living at a nursing home with a PMHx of HTN, HLD, schizophrenia, and dementia here for coffee-ground emesis.    Patient is pleasantly demented, but is somewhat a poor historian.  History obtained primarily through discuss prior discussion with nursing home staff and emergency physician.  Family is not at bedside.  Per history, the patient was doing well and nursing home until today when he had 1 episode of witnessed coffee-ground emesis associated with nausea.  Per the patient was only 1 episode.  Denies any further emesis, abdominal pain, diarrhea, hematochezia, melena, chest pain, dyspnea.  Does not have much of appetite in general.  No history of GI bleeding per the records.  He does have atrial fibrillation but is not on anticoagulation.    Of note, the patient is DNR based on advance care notes at the nursing home.    In the ED, vital signs were stable.  Labs remarkable for mixed macrocytic anemia with a hemoglobin of 13. No further episodes of emesis or melena/hematochezia.      Past Medical History:   Diagnosis Date    Hyperlipidemia     Hypertension     Paroxysmal atrial fibrillation     Schizophrenia, unspecified        History reviewed. No pertinent surgical history.    Review of patient's allergies indicates:  No Known Allergies    No current facility-administered medications on file  prior to encounter.     No current outpatient medications on file prior to encounter.     Family History       Problem Relation (Age of Onset)    No Known Problems Mother, Father          Tobacco Use    Smoking status: Former     Types: Cigarettes    Smokeless tobacco: Never   Substance and Sexual Activity    Alcohol use: Not Currently    Drug use: Never    Sexual activity: Not Currently     Review of Systems   Reason unable to perform ROS: dementia.   Objective:     Vital Signs (Most Recent):  Temp: 97.4 °F (36.3 °C) (05/26/23 0007)  Pulse: 90 (05/26/23 0007)  Resp: 18 (05/26/23 0007)  BP: 130/72 (05/26/23 0007)  SpO2: (!) 94 % (05/26/23 0007) Vital Signs (24h Range):  Temp:  [97.4 °F (36.3 °C)] 97.4 °F (36.3 °C)  Pulse:  [85-90] 90  Resp:  [18] 18  SpO2:  [94 %-98 %] 94 %  BP: (130-134)/(68-72) 130/72     Weight: 68 kg (150 lb)  Body mass index is 22.81 kg/m².     Physical Exam  Vitals and nursing note reviewed.   Constitutional:       Appearance: Normal appearance. He is normal weight.   HENT:      Head: Normocephalic and atraumatic.      Mouth/Throat:      Mouth: Mucous membranes are dry.   Eyes:      Extraocular Movements: Extraocular movements intact.      Pupils: Pupils are equal, round, and reactive to light.   Cardiovascular:      Rate and Rhythm: Normal rate and regular rhythm.      Pulses: Normal pulses.      Heart sounds: Murmur heard.   Pulmonary:      Effort: Pulmonary effort is normal.      Breath sounds: Normal breath sounds. No wheezing or rales.   Abdominal:      General: Abdomen is flat. Bowel sounds are normal. There is no distension.      Palpations: Abdomen is soft.      Tenderness: There is abdominal tenderness.   Musculoskeletal:         General: No swelling.   Skin:     General: Skin is warm.      Capillary Refill: Capillary refill takes less than 2 seconds.      Findings: No erythema.   Neurological:      General: No focal deficit present.      Mental Status: He is alert. Mental status is  at baseline. He is disoriented.            CRANIAL NERVES     CN III, IV, VI   Pupils are equal, round, and reactive to light.     Significant Labs: All pertinent labs within the past 24 hours have been reviewed.    Significant Imaging: I have reviewed all pertinent imaging results/findings within the past 24 hours.    Assessment/Plan:     * Acute upper GI bleed  - Hgb 13.6 with MCV > 100 but RDW > 15; likely mixed anemia  - No further episodes of coffee-ground emesis or tania hematemesis  - Start IV PPI BID  - NPO  - GI consult  - Type and screen, CBC q8h for next 24 hours  - Iron panel pending  - Start IVF @ 100 cc/hr  - Telemetry      Macrocytic anemia  - MCV > 100 but RDW high  - Will send off iron panel, B12, folate  - GIB management as above      Mild dementia  - Continue donepezil 10mg qHS  - Delirium precautions, fall risk      ACP (advance care planning)  - Has well documented code status of DNR  - Reviewed records at nursing home  - Time spent during advance care planning: 15 minutes      Anxiety  - Continue lexapro      Schizophrenia  - Continue daily seroquel and depakote      Paroxysmal atrial fibrillation  Patient with Persistent (7 days or more) atrial fibrillation which is controlled currently with Beta Blocker. Patient is currently in atrial fibrillation.[id number] Score: 2. Anticoagulation not indicated due to acute GIB.   - Not on long term AC per previous family discussions        Primary hypertension  - Continue norvasc        VTE Risk Mitigation (From admission, onward)           Ordered     IP VTE HIGH RISK PATIENT  Once         05/26/23 0257     Place sequential compression device  Until discontinued         05/26/23 0257                       On 05/26/2023, patient should be placed in hospital observation services under my care.        Toby Chamorro MD  Department of Hospital Medicine  [place] - Emergency Dept

## 2023-05-26 NOTE — ED PROVIDER NOTES
Emergency Department Encounter  Provider Note  Encounter Date: 5/25/2023    Patient Name: Augie James  MRN: [id number]    History of Present Illness   HPI  History of Present Illness:    Chief Complaint:   Chief Complaint   Patient presents with    Vomiting     Vomited coffee ground emesis 1 time at facility. No meds given en route by EMS.       89m with hx dementia, afib not on AC, pw witnessed episode of coffee-ground emesis.  Patient does not remember what happened.  Does not have any complaints currently.    The following PMH/PSH/SocHx/FamHx has been reviewed by myself:    No past medical history on file.  No past surgical history on file.     No family history on file.    Allergies reviewed:  Review of patient's allergies indicates:  No Known Allergies    Medications reviewed:      ROS  Review of Systems:  Unable to assess due to dementia.    Physical Exam   Physical Exam    Initial Vitals   BP Pulse Resp Temp SpO2   05/25/23 2357 05/25/23 2357 05/25/23 2357 05/25/23 2359 05/25/23 2357   134/68 85 18 97.4 °F (36.3 °C) 98 %      MAP       --                  Triage vital signs reviewed.    Constitutional: Well-nourished, well-developed. Not in acute distress.  HENT: Normocephalic, atraumatic. Moist mucous membranes.  Eyes: No conjunctival injection.  Resp: Normal respiratory effort, breathing unlabored.  Cardio: Regular rate and rhythm.  GI: Abdomen non-distended.  No pain with palpation.  MSK: Extremities without any deformities noted. No lower extremity edema.  Skin: Warm and dry. No rashes or lesions noted.  Neuro: Awake and alert. Moves all extremities.    ED Course   Procedures    Medical Decision Making    History Acquisition     The history is provided by EMS.   Assessment requiring an independent historian and why historian was needed:  EMS, patient has dementia    Review of prior external/non ED notes: none available    Differential Diagnoses   Based on available information and initial  assessment, the working Differential Diagnosis includes, but is not limited to:  Bowel obstruction, incarcerated/strangulated hernia, ileus, appendicitis, cholecystitis, aspirated foreign body, esophageal food impaction, biliary colic, colitis/diverticulitis, gastroenteritis, esophagitis, hepatitis, GERD, PUD, constipation,  .    EKG   EKG ordered and independently reviewed by me:   AFib, prolonged QT, P 92, no STEMI    Labs   Lab tests ordered and independently reviewed by me:    Labs Reviewed   CBC W/ AUTO DIFFERENTIAL - Abnormal; Notable for the following components:       Result Value    RBC 4.09 (*)     Hemoglobin 13.6 (*)      (*)     MCH 33.3 (*)     RDW 15.3 (*)     Platelets 126 (*)     Immature Granulocytes 0.8 (*)     Gran # (ANC) 8.0 (*)     Immature Grans (Abs) 0.09 (*)     All other components within normal limits   COMPREHENSIVE METABOLIC PANEL - Abnormal; Notable for the following components:    CO2 20 (*)     Albumin 3.2 (*)     All other components within normal limits   TROPONIN I   TROPONIN I         Imaging   Imaging ordered and independently reviewed by me:   Imaging Results    None              Additional Consideration   Augie James  presents to the emergency Department today with witnessed coffee-ground emesis.  Patient has stable vital signs, will obtain labs, admission for observation given his high-risk.  Not on anticoagulation.    Additional testing considered but not indicated during the course of this workup: further imaging and labwork, not indicated  Co-morbidities/chronic illness/exacerbation of chronic illness considered which impacted clinical decision making:  Advanced age, AFib, dementia  Procedures done in the ED or plan for the OR: Yes, describe:  Possible EGD  Social determinants of care considered during development of treatment plan include: Decreased medical literacy  Discussion of management or test interpretation with external provider: Yes, describe:   Admitting team  DNR discussion: No    The patient's list of active medications and allergies as documented per RN staff has been reviewed.  Medications given in the ED and/or prescribed:   Medications   pantoprazole injection 40 mg (40 mg Intravenous Given 5/26/23 0100)             ED Course as of 05/26/23 0141   Fri May 26, 2023   0141 CBC auto differential(!) [CS]   0141 Comprehensive metabolic panel(!) [CS]      ED Course User Index  [CS] Ronel Conroy MD       Explanation of disposition:  Admission    Clinical Impression:     1. Coffee ground emesis    2. Vomiting         Ronel Conroy MD  05/26/23 0141

## 2023-05-26 NOTE — TREATMENT PLAN
Mr. James presents following episode of coffee-ground emesis.  Hemoglobin elevated, although is slightly down trending on repeat.  Has leukocytosis as well, which could be secondary to bleeding.  No evidence of acute infection noted by me; will check chest x-ray to ensure no concern for aspiration, although patient is breathing normally and afebrile.  Discussion of goals of care with patient and family; will maintain DNR and discussed treatment with oral PPI rather than invasive procedure with EGD.  Avoid NSAIDs in future; can use acetaminophen instead. Monitor today with likely discharge back to [facility] tomorrow am.    Brennan Rodríguez MD  [facility] Medicine

## 2023-05-26 NOTE — ASSESSMENT & PLAN NOTE
- Has well documented code status of DNR  - Reviewed records at [facility]

## 2023-05-27 PROBLEM — J18.9 PNEUMONIA: Status: ACTIVE | Noted: 2023-05-27

## 2023-05-27 LAB
ANION GAP SERPL CALC-SCNC: 12 MMOL/L (ref 8–16)
BASOPHILS # BLD AUTO: 0.05 K/UL (ref 0–0.2)
BASOPHILS NFR BLD: 0.5 % (ref 0–1.9)
BUN SERPL-MCNC: 16 MG/DL (ref 10–30)
CALCIUM SERPL-MCNC: 8.7 MG/DL (ref 8.7–10.5)
CHLORIDE SERPL-SCNC: 104 MMOL/L (ref 95–110)
CO2 SERPL-SCNC: 21 MMOL/L (ref 23–29)
CREAT SERPL-MCNC: 0.8 MG/DL (ref 0.5–1.4)
DIFFERENTIAL METHOD: ABNORMAL
EOSINOPHIL # BLD AUTO: 0.2 K/UL (ref 0–0.5)
EOSINOPHIL NFR BLD: 1.6 % (ref 0–8)
ERYTHROCYTE [DISTWIDTH] IN BLOOD BY AUTOMATED COUNT: 15.3 % (ref 11.5–14.5)
EST. GFR  (NO RACE VARIABLE): >60 ML/MIN/1.73 M^2
GLUCOSE SERPL-MCNC: 87 MG/DL (ref 70–110)
HCT VFR BLD AUTO: 41.1 % (ref 40–54)
HGB BLD-MCNC: 13.3 G/DL (ref 14–18)
IMM GRANULOCYTES # BLD AUTO: 0.12 K/UL (ref 0–0.04)
IMM GRANULOCYTES NFR BLD AUTO: 1.1 % (ref 0–0.5)
LACTATE SERPL-SCNC: 2.5 MMOL/L (ref 0.5–2.2)
LYMPHOCYTES # BLD AUTO: 2.2 K/UL (ref 1–4.8)
LYMPHOCYTES NFR BLD: 19.9 % (ref 18–48)
MCH RBC QN AUTO: 33.5 PG (ref 27–31)
MCHC RBC AUTO-ENTMCNC: 32.4 G/DL (ref 32–36)
MCV RBC AUTO: 104 FL (ref 82–98)
MONOCYTES # BLD AUTO: 0.8 K/UL (ref 0.3–1)
MONOCYTES NFR BLD: 7.2 % (ref 4–15)
NEUTROPHILS # BLD AUTO: 7.6 K/UL (ref 1.8–7.7)
NEUTROPHILS NFR BLD: 69.7 % (ref 38–73)
NRBC BLD-RTO: 0 /100 WBC
PLATELET # BLD AUTO: 111 K/UL (ref 150–450)
PMV BLD AUTO: 11.3 FL (ref 9.2–12.9)
POTASSIUM SERPL-SCNC: 4.4 MMOL/L (ref 3.5–5.1)
RBC # BLD AUTO: 3.97 M/UL (ref 4.6–6.2)
SODIUM SERPL-SCNC: 137 MMOL/L (ref 136–145)
WBC # BLD AUTO: 10.93 K/UL (ref 3.9–12.7)

## 2023-05-27 PROCEDURE — 96365 THER/PROPH/DIAG IV INF INIT: CPT

## 2023-05-27 PROCEDURE — 96367 TX/PROPH/DG ADDL SEQ IV INF: CPT

## 2023-05-27 PROCEDURE — 36415 COLL VENOUS BLD VENIPUNCTURE: CPT | Performed by: HOSPITALIST

## 2023-05-27 PROCEDURE — 63600175 PHARM REV CODE 636 W HCPCS: Performed by: HOSPITALIST

## 2023-05-27 PROCEDURE — 96376 TX/PRO/DX INJ SAME DRUG ADON: CPT

## 2023-05-27 PROCEDURE — C9113 INJ PANTOPRAZOLE SODIUM, VIA: HCPCS | Performed by: INTERNAL MEDICINE

## 2023-05-27 PROCEDURE — 63600175 PHARM REV CODE 636 W HCPCS: Performed by: INTERNAL MEDICINE

## 2023-05-27 PROCEDURE — 85025 COMPLETE CBC W/AUTO DIFF WBC: CPT | Performed by: HOSPITALIST

## 2023-05-27 PROCEDURE — 80048 BASIC METABOLIC PNL TOTAL CA: CPT | Performed by: HOSPITALIST

## 2023-05-27 PROCEDURE — 25000003 PHARM REV CODE 250: Performed by: HOSPITALIST

## 2023-05-27 PROCEDURE — G0378 HOSPITAL OBSERVATION PER HR: HCPCS

## 2023-05-27 PROCEDURE — 83605 ASSAY OF LACTIC ACID: CPT | Performed by: HOSPITALIST

## 2023-05-27 PROCEDURE — 25000003 PHARM REV CODE 250: Performed by: INTERNAL MEDICINE

## 2023-05-27 RX ADMIN — QUETIAPINE FUMARATE 100 MG: 100 TABLET ORAL at 09:05

## 2023-05-27 RX ADMIN — DONEPEZIL HYDROCHLORIDE 10 MG: 5 TABLET ORAL at 09:05

## 2023-05-27 RX ADMIN — PANTOPRAZOLE SODIUM 40 MG: 40 INJECTION, POWDER, LYOPHILIZED, FOR SOLUTION INTRAVENOUS at 10:05

## 2023-05-27 RX ADMIN — PRAVASTATIN SODIUM 20 MG: 20 TABLET ORAL at 09:05

## 2023-05-27 RX ADMIN — AMLODIPINE BESYLATE 5 MG: 5 TABLET ORAL at 09:05

## 2023-05-27 RX ADMIN — AZITHROMYCIN MONOHYDRATE 500 MG: 500 INJECTION, POWDER, LYOPHILIZED, FOR SOLUTION INTRAVENOUS at 06:05

## 2023-05-27 RX ADMIN — PANTOPRAZOLE SODIUM 40 MG: 40 INJECTION, POWDER, LYOPHILIZED, FOR SOLUTION INTRAVENOUS at 09:05

## 2023-05-27 RX ADMIN — CEFTRIAXONE 1 G: 1 INJECTION, POWDER, FOR SOLUTION INTRAMUSCULAR; INTRAVENOUS at 09:05

## 2023-05-27 RX ADMIN — DIVALPROEX SODIUM 250 MG: 250 TABLET, DELAYED RELEASE ORAL at 09:05

## 2023-05-27 RX ADMIN — ESCITALOPRAM 5 MG: 5 TABLET, FILM COATED ORAL at 09:05

## 2023-05-27 NOTE — ASSESSMENT & PLAN NOTE
- Hgb 13.6 with MCV > 100 but RDW > 15; likely mixed anemia  - No further episodes of coffee-ground emesis or tania hematemesis  - Start IV PPI BID  - GI consult:  EGD not consistent with patient's goals of care, will treat with p.o. Protonix at time of discharge   - has had no further episodes of hematemesis and blood counts appear stable at this time

## 2023-05-27 NOTE — ASSESSMENT & PLAN NOTE
Patient with Persistent (7 days or more) atrial fibrillation which is controlled currently with Beta Blocker. Patient is currently in atrial fibrillation.[id number] Score: 2. Anticoagulation not indicated due to acute GIB.   - Not on long term AC per previous family discussions

## 2023-05-27 NOTE — PROGRESS NOTES
[facility] Medicine  Progress Note    Patient Name: Augie James  MRN: [id number]  Patient Class: OP- Observation   Admission Date: 5/25/2023  Length of Stay: 0 days  Attending Physician: Brennan Rodríguez, *  Primary Care Provider: No primary care provider on file.        Subjective:     Principal Problem:Acute upper GI bleed        HPI:  89 yo male living at a nursing home with a PMHx of HTN, HLD, schizophrenia, and dementia here for coffee-ground emesis.    Patient is pleasantly demented, but is somewhat a poor historian.  History obtained primarily through discuss prior discussion with nursing home staff and emergency physician.  Family is not at bedside.  Per history, the patient was doing well and nursing home until today when he had 1 episode of witnessed coffee-ground emesis associated with nausea.  Per the patient was only 1 episode.  Denies any further emesis, abdominal pain, diarrhea, hematochezia, melena, chest pain, dyspnea.  Does not have much of appetite in general.  No history of GI bleeding per the records.  He does have atrial fibrillation but is not on anticoagulation.    Of note, the patient is DNR based on advance care notes at the nursing home.    In the ED, vital signs were stable.  Labs remarkable for mixed macrocytic anemia with a hemoglobin of 13. No further episodes of emesis or melena/hematochezia.      Overview/Hospital Course:  No notes on file    Interval History:  Doing well today, requesting applesauce.  Discussed with his son at bedside.  Yesterday with development of leukocytosis.  Chest x-ray with possible developing consolidation, possibly aspiration from earlier hematemesis.  Started on antibiotics.    Review of Systems   Unable to perform ROS: Dementia   Objective:     Vital Signs (Most Recent):  Temp: 97.5 °F (36.4 °C) (05/27/23 1151)  Pulse: 79 (05/27/23 1151)  Resp: 18 (05/27/23 1151)  BP: (!) 148/69 (05/27/23 1151)  SpO2: (!) 94 % (05/27/23  1151) Vital Signs (24h Range):  Temp:  [96.6 °F (35.9 °C)-98.7 °F (37.1 °C)] 97.5 °F (36.4 °C)  Pulse:  [] 79  Resp:  [18] 18  SpO2:  [92 %-97 %] 94 %  BP: (101-148)/(54-88) 148/69     Weight: 72.5 kg (159 lb 13.3 oz)  Body mass index is 24.3 kg/m².  No intake or output data in the 24 hours ending 05/27/23 1445      Physical Exam  Vitals and nursing note reviewed.   Constitutional:       Appearance: Normal appearance. He is normal weight.   HENT:      Head: Normocephalic and atraumatic.      Mouth/Throat:      Mouth: Mucous membranes are dry.   Eyes:      Extraocular Movements: Extraocular movements intact.      Pupils: Pupils are equal, round, and reactive to light.   Cardiovascular:      Rate and Rhythm: Normal rate and regular rhythm.      Pulses: Normal pulses.      Heart sounds: Murmur heard.   Pulmonary:      Effort: Pulmonary effort is normal.      Breath sounds: Normal breath sounds. No wheezing or rales.   Abdominal:      General: Abdomen is flat. Bowel sounds are normal. There is no distension.      Palpations: Abdomen is soft.      Tenderness: There is abdominal tenderness.   Musculoskeletal:         General: No swelling.   Skin:     General: Skin is warm.      Capillary Refill: Capillary refill takes less than 2 seconds.      Findings: No erythema.   Neurological:      General: No focal deficit present.      Mental Status: He is alert. Mental status is at baseline. He is disoriented.           Significant Labs: All pertinent labs within the past 24 hours have been reviewed.    Significant Imaging: I have reviewed all pertinent imaging results/findings within the past 24 hours.      Assessment/Plan:      * Acute upper GI bleed  - Hgb 13.6 with MCV > 100 but RDW > 15; likely mixed anemia  - No further episodes of coffee-ground emesis or tania hematemesis  - Start IV PPI BID  - GI consult:  EGD not consistent with patient's goals of care, will treat with p.o. Protonix at time of discharge   - has had  no further episodes of hematemesis and blood counts appear stable at this time      Pneumonia  -developed leukocytosis with concern for aspiration pneumonia on chest x-ray  -likely secondary to hematemesis   -appears to be improving today on antibiotics, can likely quickly transition to oral therapy for discharge      ACP (advance care planning)  - Has well documented code status of DNR  - Reviewed records at nursing home      Anxiety  - Continue lexapro      Schizophrenia  - Continue daily seroquel and depakote      Paroxysmal atrial fibrillation  Patient with Persistent (7 days or more) atrial fibrillation which is controlled currently with Beta Blocker. Patient is currently in atrial fibrillation.[id number] Score: 2. Anticoagulation not indicated due to acute GIB.   - Not on long term AC per previous family discussions        Primary hypertension  - Continue norvasc      Macrocytic anemia  - MCV > 100 but RDW high  - Will send off iron panel, B12, folate  - GIB management as above      Mild dementia  - Continue donepezil 10mg qHS  - Delirium precautions, fall risk        VTE Risk Mitigation (From admission, onward)         Ordered     IP VTE HIGH RISK PATIENT  Once         05/26/23 0257     Place sequential compression device  Until discontinued         05/26/23 0257                Discharge Planning   ROBERTA:      Code Status: DNR   Is the patient medically ready for discharge?:     Reason for patient still in hospital (select all that apply): Patient trending condition  Discharge Plan A: Return to nursing home                  Brennan Rodríguez MD  Department of Hospital Medicine   [facility]

## 2023-05-27 NOTE — SUBJECTIVE & OBJECTIVE
Interval History:  Doing well today, requesting applesauce.  Discussed with his son at bedside.  Yesterday with development of leukocytosis.  Chest x-ray with possible developing consolidation, possibly aspiration from earlier hematemesis.  Started on antibiotics.    Review of Systems   Unable to perform ROS: Dementia   Objective:     Vital Signs (Most Recent):  Temp: 97.5 °F (36.4 °C) (05/27/23 1151)  Pulse: 79 (05/27/23 1151)  Resp: 18 (05/27/23 1151)  BP: (!) 148/69 (05/27/23 1151)  SpO2: (!) 94 % (05/27/23 1151) Vital Signs (24h Range):  Temp:  [96.6 °F (35.9 °C)-98.7 °F (37.1 °C)] 97.5 °F (36.4 °C)  Pulse:  [] 79  Resp:  [18] 18  SpO2:  [92 %-97 %] 94 %  BP: (101-148)/(54-88) 148/69     Weight: 72.5 kg (159 lb 13.3 oz)  Body mass index is 24.3 kg/m².  No intake or output data in the 24 hours ending 05/27/23 1445      Physical Exam  Vitals and nursing note reviewed.   Constitutional:       Appearance: Normal appearance. He is normal weight.   HENT:      Head: Normocephalic and atraumatic.      Mouth/Throat:      Mouth: Mucous membranes are dry.   Eyes:      Extraocular Movements: Extraocular movements intact.      Pupils: Pupils are equal, round, and reactive to light.   Cardiovascular:      Rate and Rhythm: Normal rate and regular rhythm.      Pulses: Normal pulses.      Heart sounds: Murmur heard.   Pulmonary:      Effort: Pulmonary effort is normal.      Breath sounds: Normal breath sounds. No wheezing or rales.   Abdominal:      General: Abdomen is flat. Bowel sounds are normal. There is no distension.      Palpations: Abdomen is soft.      Tenderness: There is abdominal tenderness.   Musculoskeletal:         General: No swelling.   Skin:     General: Skin is warm.      Capillary Refill: Capillary refill takes less than 2 seconds.      Findings: No erythema.   Neurological:      General: No focal deficit present.      Mental Status: He is alert. Mental status is at baseline. He is disoriented.            Significant Labs: All pertinent labs within the past 24 hours have been reviewed.    Significant Imaging: I have reviewed all pertinent imaging results/findings within the past 24 hours.

## 2023-05-27 NOTE — ASSESSMENT & PLAN NOTE
-developed leukocytosis with concern for aspiration pneumonia on chest x-ray  -likely secondary to hematemesis   -appears to be improving today on antibiotics, can likely quickly transition to oral therapy for discharge

## 2023-05-28 VITALS
HEIGHT: 68 IN | OXYGEN SATURATION: 96 % | HEART RATE: 111 BPM | RESPIRATION RATE: 18 BRPM | TEMPERATURE: 97 F | SYSTOLIC BLOOD PRESSURE: 124 MMHG | BODY MASS INDEX: 24.22 KG/M2 | WEIGHT: 159.81 LBS | DIASTOLIC BLOOD PRESSURE: 83 MMHG

## 2023-05-28 LAB
ANION GAP SERPL CALC-SCNC: 12 MMOL/L (ref 8–16)
BASOPHILS # BLD AUTO: 0.06 K/UL (ref 0–0.2)
BASOPHILS NFR BLD: 0.7 % (ref 0–1.9)
BUN SERPL-MCNC: 16 MG/DL (ref 10–30)
CALCIUM SERPL-MCNC: 8.5 MG/DL (ref 8.7–10.5)
CHLORIDE SERPL-SCNC: 105 MMOL/L (ref 95–110)
CO2 SERPL-SCNC: 22 MMOL/L (ref 23–29)
CREAT SERPL-MCNC: 0.7 MG/DL (ref 0.5–1.4)
DIFFERENTIAL METHOD: ABNORMAL
EOSINOPHIL # BLD AUTO: 0.3 K/UL (ref 0–0.5)
EOSINOPHIL NFR BLD: 3.4 % (ref 0–8)
ERYTHROCYTE [DISTWIDTH] IN BLOOD BY AUTOMATED COUNT: 15.1 % (ref 11.5–14.5)
EST. GFR  (NO RACE VARIABLE): >60 ML/MIN/1.73 M^2
GLUCOSE SERPL-MCNC: 92 MG/DL (ref 70–110)
HCT VFR BLD AUTO: 38.6 % (ref 40–54)
HGB BLD-MCNC: 12.9 G/DL (ref 14–18)
IMM GRANULOCYTES # BLD AUTO: 0.11 K/UL (ref 0–0.04)
IMM GRANULOCYTES NFR BLD AUTO: 1.2 % (ref 0–0.5)
LYMPHOCYTES # BLD AUTO: 2.6 K/UL (ref 1–4.8)
LYMPHOCYTES NFR BLD: 28.9 % (ref 18–48)
MCH RBC QN AUTO: 33.5 PG (ref 27–31)
MCHC RBC AUTO-ENTMCNC: 33.4 G/DL (ref 32–36)
MCV RBC AUTO: 100 FL (ref 82–98)
MONOCYTES # BLD AUTO: 0.8 K/UL (ref 0.3–1)
MONOCYTES NFR BLD: 9.1 % (ref 4–15)
NEUTROPHILS # BLD AUTO: 5 K/UL (ref 1.8–7.7)
NEUTROPHILS NFR BLD: 56.7 % (ref 38–73)
NRBC BLD-RTO: 0 /100 WBC
PLATELET # BLD AUTO: 118 K/UL (ref 150–450)
PMV BLD AUTO: 11.4 FL (ref 9.2–12.9)
POTASSIUM SERPL-SCNC: 3.5 MMOL/L (ref 3.5–5.1)
RBC # BLD AUTO: 3.85 M/UL (ref 4.6–6.2)
SODIUM SERPL-SCNC: 139 MMOL/L (ref 136–145)
WBC # BLD AUTO: 8.89 K/UL (ref 3.9–12.7)

## 2023-05-28 PROCEDURE — 36415 COLL VENOUS BLD VENIPUNCTURE: CPT | Performed by: HOSPITALIST

## 2023-05-28 PROCEDURE — 25000003 PHARM REV CODE 250: Performed by: HOSPITALIST

## 2023-05-28 PROCEDURE — 96376 TX/PRO/DX INJ SAME DRUG ADON: CPT

## 2023-05-28 PROCEDURE — 63600175 PHARM REV CODE 636 W HCPCS: Performed by: INTERNAL MEDICINE

## 2023-05-28 PROCEDURE — 63600175 PHARM REV CODE 636 W HCPCS: Performed by: HOSPITALIST

## 2023-05-28 PROCEDURE — 80048 BASIC METABOLIC PNL TOTAL CA: CPT | Performed by: HOSPITALIST

## 2023-05-28 PROCEDURE — G0378 HOSPITAL OBSERVATION PER HR: HCPCS

## 2023-05-28 PROCEDURE — 96365 THER/PROPH/DIAG IV INF INIT: CPT | Mod: 59

## 2023-05-28 PROCEDURE — 25000003 PHARM REV CODE 250: Performed by: INTERNAL MEDICINE

## 2023-05-28 PROCEDURE — 85025 COMPLETE CBC W/AUTO DIFF WBC: CPT | Performed by: HOSPITALIST

## 2023-05-28 PROCEDURE — C9113 INJ PANTOPRAZOLE SODIUM, VIA: HCPCS | Performed by: INTERNAL MEDICINE

## 2023-05-28 RX ORDER — AMOXICILLIN AND CLAVULANATE POTASSIUM 875; 125 MG/1; MG/1
1 TABLET, FILM COATED ORAL 2 TIMES DAILY
Start: 2023-05-28

## 2023-05-28 RX ADMIN — PANTOPRAZOLE SODIUM 40 MG: 40 INJECTION, POWDER, LYOPHILIZED, FOR SOLUTION INTRAVENOUS at 08:05

## 2023-05-28 RX ADMIN — ESCITALOPRAM 5 MG: 5 TABLET, FILM COATED ORAL at 08:05

## 2023-05-28 RX ADMIN — CEFTRIAXONE 1 G: 1 INJECTION, POWDER, FOR SOLUTION INTRAMUSCULAR; INTRAVENOUS at 08:05

## 2023-05-28 RX ADMIN — AMLODIPINE BESYLATE 5 MG: 5 TABLET ORAL at 08:05

## 2023-05-28 RX ADMIN — DIVALPROEX SODIUM 250 MG: 250 TABLET, DELAYED RELEASE ORAL at 08:05

## 2023-05-28 NOTE — PLAN OF CARE
05/28/23 0848   Post-Acute Status   Post-Acute Authorization Placement   Post-Acute Placement Status Referrals Sent  (Transfer orders sent to the [facility] for discharge)

## 2023-05-28 NOTE — DISCHARGE SUMMARY
[facility] Medicine  Discharge Summary      Patient Name: Augie James  MRN: [id number]  NEVAEH: [id number]  Patient Class: OP- Observation  Admission Date: 5/25/2023  Hospital Length of Stay: 0 days  Discharge Date and Time:  05/28/2023 10:28 AM  Attending Physician: Brennan Rodríguez, *   Discharging Provider: Brennan Rodríguez MD  Primary Care Provider: No primary care provider on file.    Primary Care Team: Networked reference to record PCT     HPI:   89 yo male living at a nursing home with a PMHx of HTN, HLD, schizophrenia, and dementia here for coffee-ground emesis.    Patient is pleasantly demented, but is somewhat a poor historian.  History obtained primarily through discuss prior discussion with nursing home staff and emergency physician.  Family is not at bedside.  Per history, the patient was doing well and nursing home until today when he had 1 episode of witnessed coffee-ground emesis associated with nausea.  Per the patient was only 1 episode.  Denies any further emesis, abdominal pain, diarrhea, hematochezia, melena, chest pain, dyspnea.  Does not have much of appetite in general.  No history of GI bleeding per the records.  He does have atrial fibrillation but is not on anticoagulation.    Of note, the patient is DNR based on advance care notes at the nursing home.    In the ED, vital signs were stable.  Labs remarkable for mixed macrocytic anemia with a hemoglobin of 13. No further episodes of emesis or melena/hematochezia.      * No surgery found *      Hospital Course:   Mr. James presented following episode of coffee-ground emesis at nursing home.  He was started on GI bleed pathway with IV Protonix.  Given his advanced age, dementia, and rapid resolution of hematemesis with no significant drop in hemoglobin, decision was made to defer EGD and treat empirically with oral Protonix.  Of note, he did develop leukocytosis while in house with x-ray concerning for  "possible developing pneumonia.  Likely secondary to aspiration.  Will complete 5 day course Augmentin as outpatient.    /74   Pulse 106   Temp 98 °F (36.7 °C) (Axillary)   Resp 18   Ht 5' 8" (1.727 m)   Wt 72.5 kg (159 lb 13.3 oz)   SpO2 95%   BMI 24.30 kg/m²   Physical Exam  Vitals and nursing note reviewed.   Constitutional:       Appearance: Normal appearance. He is normal weight.   HENT:      Head: Normocephalic and atraumatic.      Mouth/Throat:      Mouth: Mucous membranes are dry.   Eyes:      Extraocular Movements: Extraocular movements intact.      Pupils: Pupils are equal, round, and reactive to light.   Cardiovascular:      Rate and Rhythm: Normal rate and regular rhythm.      Pulses: Normal pulses.      Heart sounds: Murmur heard.   Pulmonary:      Effort: Pulmonary effort is normal.      Breath sounds: Normal breath sounds. No wheezing or rales.   Abdominal:      General: Abdomen is flat. Bowel sounds are normal. There is no distension.      Palpations: Abdomen is soft.      Tenderness: There is abdominal tenderness.   Musculoskeletal:         General: No swelling.   Skin:     General: Skin is warm.      Capillary Refill: Capillary refill takes less than 2 seconds.      Findings: No erythema.   Neurological:      General: No focal deficit present.      Mental Status: He is alert. Mental status is at baseline. He is disoriented.        Goals of Care Treatment Preferences:  Code Status: DNR    Living Will: Yes              Consults:   Consults (From admission, onward)        Status Ordering Provider     IP consult to case management  Once        Provider:  (Not yet assigned)    Acknowledged DERECK BROWN     Inpatient consult to Gastroenterology  Once        Provider:  (Not yet assigned)    Completed CAN SCHWARZ          No new Assessment & Plan notes have been filed under this hospital service since the last note was generated.  Service: Hospital Medicine    Final Active " Diagnoses:    Diagnosis Date Noted POA    PRINCIPAL PROBLEM:  Acute upper GI bleed [K92.2] 05/26/2023 Yes    Pneumonia [J18.9] 05/27/2023 Yes    Mild dementia [F03.A0] 05/26/2023 Yes    Macrocytic anemia [D64.9] 05/26/2023 Yes    Primary hypertension [I10] 05/26/2023 Yes    Paroxysmal atrial fibrillation [I48.0] 05/26/2023 Yes    Schizophrenia [F20.9] 05/26/2023 Yes    Anxiety [F41.9] 05/26/2023 Yes    ACP (advance care planning) [Z71.89] 05/26/2023 Not Applicable      Problems Resolved During this Admission:       Discharged Condition: good    Disposition: Home or Self Care    Follow Up:    Patient Instructions:   No discharge procedures on file.    Significant Diagnostic Studies: N/A    Pending Diagnostic Studies:     None         Medications:  Reconciled Home Medications:      Medication List      START taking these medications    amoxicillin-clavulanate 875-125mg 875-125 mg per tablet  Commonly known as: AUGMENTIN  Take 1 tablet by mouth 2 (two) times daily. End 6/1/23     pantoprazole 40 MG tablet  Commonly known as: PROTONIX  Take 1 tablet (40 mg total) by mouth once daily.        CONTINUE taking these medications    acetaminophen 650 MG Tbsr  Commonly known as: TYLENOL  Take 650 mg by mouth every 8 (eight) hours as needed.     amLODIPine 5 MG tablet  Commonly known as: NORVASC  Take 5 mg by mouth once daily.     dextromethorphan-guaiFENesin  mg/5 mL Liqd  Commonly known as: ADULT ROBITUSSIN PEAK COLD DM  Take 5 mLs by mouth every 4 (four) hours as needed.     divalproex 250 MG EC tablet  Commonly known as: DEPAKOTE  Take 250 mg by mouth 2 (two) times daily.     donepeziL 10 MG tablet  Commonly known as: ARICEPT  Take 10 mg by mouth every evening.     EScitalopram oxalate 10 MG tablet  Commonly known as: LEXAPRO  Take 10 mg by mouth once daily.     multivit-min-iron fum-folic ac 7.5 mg iron-400 mcg Tab  Take 1 tablet by mouth once daily.     OCUVITE ORAL  Take 1 tablet by mouth once daily.      pravastatin 20 MG tablet  Commonly known as: PRAVACHOL  Take 20 mg by mouth once daily.     QUEtiapine 100 MG Tab  Commonly known as: SEROQUEL  Take 100 mg by mouth every evening.        STOP taking these medications    ALPRAZolam 0.5 MG tablet  Commonly known as: XANAX     aspirin 81 MG EC tablet  Commonly known as: ECOTRIN     atenoloL 25 MG tablet  Commonly known as: TENORMIN     naproxen 250 MG tablet  Commonly known as: NAPROSYN            Indwelling Lines/Drains at time of discharge:   Lines/Drains/Airways     Drain  Duration           Female External Urinary Catheter 05/27/23 2230 <1 day                Time spent on the discharge of patient: 32 minutes         Brennan Rodríguez MD  Department of Hospital Medicine  [facility]

## 2023-05-28 NOTE — PLAN OF CARE
VN note: VN completed AVS and attachments and notified bedside nurse, Ingrid. Will cont to be available and intervene prn.

## 2023-05-28 NOTE — NURSING
Patient being discharged back to nursing home, IV and telemetry box removed, discharge instructions printed and sent along with patient. Nursing home transportation is here waiting downstairs to bring patient back to the nursing home. Awaiting hospital transportation, to transport patient downstairs.

## 2023-05-28 NOTE — HOSPITAL COURSE
"Mr. James presented following episode of coffee-ground emesis at nursing home.  He was started on GI bleed pathway with IV Protonix.  Given his advanced age, dementia, and rapid resolution of hematemesis with no significant drop in hemoglobin, decision was made to defer EGD and treat empirically with oral Protonix.  Of note, he did develop leukocytosis while in house with x-ray concerning for possible developing pneumonia.  Likely secondary to aspiration.  Will complete 5 day course Augmentin as outpatient.    /74   Pulse 106   Temp 98 °F (36.7 °C) (Axillary)   Resp 18   Ht 5' 8" (1.727 m)   Wt 72.5 kg (159 lb 13.3 oz)   SpO2 95%   BMI 24.30 kg/m²   Physical Exam  Vitals and nursing note reviewed.   Constitutional:       Appearance: Normal appearance. He is normal weight.   HENT:      Head: Normocephalic and atraumatic.      Mouth/Throat:      Mouth: Mucous membranes are dry.   Eyes:      Extraocular Movements: Extraocular movements intact.      Pupils: Pupils are equal, round, and reactive to light.   Cardiovascular:      Rate and Rhythm: Normal rate and regular rhythm.      Pulses: Normal pulses.      Heart sounds: Murmur heard.   Pulmonary:      Effort: Pulmonary effort is normal.      Breath sounds: Normal breath sounds. No wheezing or rales.   Abdominal:      General: Abdomen is flat. Bowel sounds are normal. There is no distension.      Palpations: Abdomen is soft.      Tenderness: There is abdominal tenderness.   Musculoskeletal:         General: No swelling.   Skin:     General: Skin is warm.      Capillary Refill: Capillary refill takes less than 2 seconds.      Findings: No erythema.   Neurological:      General: No focal deficit present.      Mental Status: He is alert. Mental status is at baseline. He is disoriented.   "

## 2023-05-29 ENCOUNTER — TELEPHONE (OUTPATIENT)
Dept: FAMILY MEDICINE | Facility: CLINIC | Age: 88
End: 2023-05-29
Payer: MEDICARE

## 2023-05-29 NOTE — TELEPHONE ENCOUNTER
----- Message from Ronel Dow sent at 5/28/2023 10:53 AM CDT -----  Regarding: follow up appointment  Good morning ! Please schedule a followup appointment for this patient , he is being discharged on today

## 2023-05-31 ENCOUNTER — OUTSIDE PLACE OF SERVICE (OUTPATIENT)
Dept: ADMINISTRATIVE | Facility: OTHER | Age: 88
End: 2023-05-31
Payer: MEDICARE

## 2023-05-31 PROCEDURE — 99307 SBSQ NF CARE SF MDM 10: CPT | Mod: ,,, | Performed by: FAMILY MEDICINE

## 2023-05-31 PROCEDURE — 99307 PR NURSING FAC CARE, SUBSEQ, IMPROVING: ICD-10-PCS | Mod: ,,, | Performed by: FAMILY MEDICINE

## 2023-08-28 PROBLEM — K92.2 ACUTE UPPER GI BLEED: Status: RESOLVED | Noted: 2023-05-26 | Resolved: 2023-08-28

## 2023-08-28 PROBLEM — J18.9 PNEUMONIA: Status: RESOLVED | Noted: 2023-05-27 | Resolved: 2023-08-28
